# Patient Record
Sex: FEMALE | Race: BLACK OR AFRICAN AMERICAN
[De-identification: names, ages, dates, MRNs, and addresses within clinical notes are randomized per-mention and may not be internally consistent; named-entity substitution may affect disease eponyms.]

---

## 2017-07-05 NOTE — ED PDOC
HPI: General Adult


Time Seen by Provider: 17 16:53


Chief Complaint (Nursing): ENT Problem


Chief Complaint (Provider): Throat Pain 


History Per: Patient


History/Exam Limitations: no limitations


Onset/Duration Of Symptoms: Days (3)


Current Symptoms Are (Timing): Still Present


Severity: Moderate


Additional Complaint(s): 


Tara Perdomo is a 46 y/o female presenting to the ER on 2017 with 

complaints of throat pain for three days. Patient describes the pain as a 

burning sensation. Denies any associated fever or cough. 








Past Medical History


Reviewed: Historical Data, Nursing Documentation, Vital Signs


Vital Signs: 


 Last Vital Signs











Temp  98.4 F   17 16:46


 


Pulse  77   17 16:46


 


Resp  18   17 16:46


 


BP  137/77   17 16:46


 


Pulse Ox  99   17 17:15














- Medical History


PMH: No Chronic Diseases





- Surgical History


Surgical History: No Surg Hx





- Family History


Family History: States: Unknown Family Hx





- Social History


Current smoker - smoking cessation education provided: No


Alcohol: None


Drugs: Denies





- Home Medications


Home Medications: 


 Ambulatory Orders











 Medication  Instructions  Recorded


 


Azithromycin [Zithromax] 250 mg PO DAILY #6 tab 17














- Allergies


Allergies/Adverse Reactions: 


 Allergies











Allergy/AdvReac Type Severity Reaction Status Date / Time


 


Penicillins Allergy  RASH Verified 17 16:46














Review of Systems


ROS Statement: Except As Marked, All Systems Reviewed And Found Negative


Constitutional: Negative for: Fever


ENT: Positive for: Throat Pain


Respiratory: Negative for: Cough





Physical Exam





- Reviewed


Nursing Documentation Reviewed: Yes


Vital Signs Reviewed: Yes





- Physical Exam


Appears: Positive for: Non-toxic, No Acute Distress


Head Exam: Positive for: ATRAUMATIC, NORMOCEPHALIC


Skin: Positive for: Normal Color.  Negative for: Rash


Eye Exam: Positive for: Normal appearance


ENT: Positive for: Normal ENT Inspection, Tonsillar Exudate ((+) bilat ), Other 

((+) midline uvula; patent airway open )


Neck: Positive for: Normal


Cardiovascular/Chest: Positive for: Regular Rate, Rhythm.  Negative for: Murmur


Respiratory: Positive for: Normal Breath Sounds.  Negative for: Respiratory 

Distress


Extremity: Positive for: Normal ROM.  Negative for: Deformity


Neurologic/Psych: Positive for: Alert, Oriented.  Negative for: Motor/Sensory 

Deficits





- ECG


O2 Sat by Pulse Oximetry: 99





Medical Decision Making


Medical Decision Makin:53





Initial Impression- 46 y/o female with throat pain





Pt will be discharged routinely with Rx for Azithromycin. Condition is stable 

for discharge. Clinical impression- Strep Pharyngitis 





Documented by Bala Resendez, acting as a scribe for Deepti Summers PA-C





All medical record entries made by the Scribe were at my direction and 

personally dictated by me. I


have reviewed the chart and agree that the record accurately reflects my 

personal performance of the


history, physical exam, medical decision making, and the department course for 

this patient. I have


also personally directed, reviewed, and agree with the discharge instructions 

and disposition.





Disposition





- Clinical Impression


Clinical Impression: 


 Strep pharyngitis








- Disposition


Disposition Time: 17:06


Condition: STABLE


Prescriptions: 


Azithromycin [Zithromax] 250 mg PO DAILY #6 tab


Instructions:  Strep Throat (ED)


Forms:  [facility] ED School/Work Excuse

## 2017-11-28 ENCOUNTER — HOSPITAL ENCOUNTER (EMERGENCY)
Dept: HOSPITAL 14 - H.ER | Age: 47
Discharge: HOME | End: 2017-11-28
Payer: MEDICAID

## 2017-11-28 VITALS
OXYGEN SATURATION: 99 % | SYSTOLIC BLOOD PRESSURE: 147 MMHG | TEMPERATURE: 97 F | HEART RATE: 78 BPM | RESPIRATION RATE: 18 BRPM | DIASTOLIC BLOOD PRESSURE: 72 MMHG

## 2017-11-28 DIAGNOSIS — M79.641: Primary | ICD-10-CM

## 2017-11-28 DIAGNOSIS — Z88.0: ICD-10-CM

## 2017-11-28 NOTE — RAD
PROCEDURE:  Right Hand Radiographs.



HISTORY:

r hand  



COMPARISON:

None available.



FINDINGS:



BONES:

No acute displaced fracture. 



JOINTS:

No dislocation. 



SOFT TISSUES:

No evidence of radiopaque foreign body. 



OTHER FINDINGS:

None.



IMPRESSION:

No acute displaced fracture, dislocation, or significant joint 

effusion identified.



If symptoms persist, or if there is continued clinical concern, x-ray 

follow-up in 7-10 days should be considered.

## 2017-11-28 NOTE — ED PDOC
Upper Extremity Pain/Injury


Time Seen by Provider: 11/28/17 09:08


Chief Complaint (Nursing): Upper Extremity Problem/Injury


Chief Complaint (Provider): Upper extremity problem


History Per: Patient


History/Exam Limitations: no limitations


Onset/Duration Of Symptoms: Days (x1)


Current Symptoms Are (Timing): Still Present


Additional Complaint(s): 





Tara Perdomo is a 47 year old female, with no past medical history, who 

presents to the emergency department complaining of a swollen right hand onset 

for 1 day. She denies any trauma or similar symptoms in the past. She denies 

any forearm pain, rash or fever. No further medical complaints.





PMD: Kristen Jimenez





Past Medical History


Reviewed: Historical Data, Nursing Documentation, Vital Signs


Vital Signs: 


 Last Vital Signs











Temp  97 F L  11/28/17 09:07


 


Pulse  78   11/28/17 09:07


 


Resp  18   11/28/17 09:07


 


BP  147/72   11/28/17 09:07


 


Pulse Ox  99   11/28/17 09:07














- Family History


Family History: States: Unknown Family Hx





- Home Medications


Home Medications: 


 Ambulatory Orders











 Medication  Instructions  Recorded


 


Azithromycin [Zithromax] 250 mg PO DAILY #6 tab 07/05/17














- Allergies


Allergies/Adverse Reactions: 


 Allergies











Allergy/AdvReac Type Severity Reaction Status Date / Time


 


Penicillins Allergy  RASH Verified 11/28/17 09:07














Review of Systems


ROS Statement: Except As Marked, All Systems Reviewed And Found Negative


Constitutional: Negative for: Fever, Other (trauma)


Musculoskeletal: Positive for: Hand Pain (right hand swollen).  Negative for: 

Arm Pain (forearm)


Skin: Negative for: Rash





Physical Exam





- Reviewed


Nursing Documentation Reviewed: Yes


Vital Signs Reviewed: Yes





- Physical Exam


Appears: Positive for: Well, Non-toxic, No Acute Distress


Head Exam: Positive for: ATRAUMATIC, NORMAL INSPECTION, NORMOCEPHALIC


Skin: Positive for: Normal Color, Warm, Dry


Eye Exam: Positive for: EOMI, Normal appearance, PERRL


Neck: Positive for: Normal, Painless ROM, Supple


Pulses-Radial (L): 2+


Pulses-Radial (R): 2+


Extremity: Positive for: Normal ROM (Full ROM of right hand), Capillary Refill (

normal < 2s), Swelling (right hand swollen. No cellulitis, no abscess no open 

skin. No forearm involvement.)


Neurologic/Psych: Positive for: Alert, Oriented





- ECG


O2 Sat by Pulse Oximetry: 99 (RA)


Pulse Ox Interpretation: Normal





Medical Decision Making


Medical Decision Making: 





Initial Impression: Swollen right hand 





Initial Plan:





--Motrin  MG po


--Hand right 3 views [RAD]


--reevaluation








1029


Hand X-Ray


FINDINGS:





BONES:


No acute displaced fracture. 





JOINTS:


No dislocation. 





SOFT TISSUES:


No evidence of radiopaque foreign body. 





OTHER FINDINGS:


None.





IMPRESSION:


No acute displaced fracture, dislocation, or significant joint effusion 

identified.





If symptoms persist, or if there is continued clinical concern, x-ray follow-up 

in 7-10 days should be considered.








1150


-The area of the splint was appropriately positioned. Good position. 

Neurovascular status remains intact. Patient tolerated the procedure well with 

no immediate complications. Patient will be discharged home with follow up with 

an outpatient clinic. 








--------------------------------------------------------------------------------

-----------------


Scribe Attestation:


Documented by Kendall Hawk, acting as a scribe for Gretta Thomas MD





Provider Scribe Attestation:


All medical record entries made by the Scribe were at my direction and 

personally dictated by me. I have reviewed the chart and agree that the record 

accurately reflects my personal performance of the history, physical exam, 

medical decision making, and the department course for this patient. I have 

also personally directed, reviewed, and agree with the discharge instructions 

and disposition.





Disposition





- Clinical Impression


Clinical Impression: 


 Hand pain








- Disposition


Referrals: 


[facility] [Outside]


[facility] [Outside]


Condition: IMPROVED


Additional Instructions: 


follow up with your primary doctor in 1-2 days


take motrin for pain


use splint for comfort


return to the ED with any worsening or concerning symptoms. 


Instructions:  Arthralgia (ED)


Forms:  [facility] (English)

## 2018-07-28 ENCOUNTER — HOSPITAL ENCOUNTER (EMERGENCY)
Dept: HOSPITAL 14 - H.ER | Age: 48
Discharge: HOME | End: 2018-07-28
Payer: MEDICAID

## 2018-07-28 VITALS
DIASTOLIC BLOOD PRESSURE: 98 MMHG | OXYGEN SATURATION: 100 % | RESPIRATION RATE: 18 BRPM | HEART RATE: 79 BPM | TEMPERATURE: 98.4 F | SYSTOLIC BLOOD PRESSURE: 141 MMHG

## 2018-07-28 DIAGNOSIS — J02.9: ICD-10-CM

## 2018-07-28 DIAGNOSIS — Z88.0: ICD-10-CM

## 2018-07-28 DIAGNOSIS — J32.9: Primary | ICD-10-CM

## 2018-07-28 NOTE — ED PDOC
HPI: CCC, URI, Sore Throat


Time Seen by Provider: 07/28/18 13:13


Chief Complaint (Nursing): Cough, Cold, Congestion


Chief Complaint (Provider): throat pain, congestion


History Per: Patient


History/Exam Limitations: no limitations


Onset/Duration Of Symptoms: Days (x3)


Current Symptoms Are (Timing): Still Present


Location Of Pain: Ear(s)


Additional Complaint(s): 


 48 year old female presents to the ED complaining of congestion, right ear pain

, and throat pain for 3 days.  Patient reports she has been taking claritin for 

congestion which has helped only minimally.  Patient denies fever and cough.  

No recent travel or known sick contacts. 





PMD: Kristen Morales





Past Medical History


Reviewed: Historical Data, Nursing Documentation, Vital Signs


Vital Signs: 


 Last Vital Signs











Temp  98.4 F   07/28/18 13:12


 


Pulse  79   07/28/18 13:12


 


Resp  18   07/28/18 13:12


 


BP  141/98 H  07/28/18 13:12


 


Pulse Ox  100   07/28/18 13:39














- Medical History


PMH: No Chronic Diseases





- Surgical History


Other surgeries: Cerebral Shunt





- Family History


Family History: States: No Known Family Hx





- Living Arrangements


Living Arrangements: With Family





- Social History


Current smoker - smoking cessation education provided: No


Alcohol: None


Drugs: Denies





- Home Medications


Home Medications: 


 Ambulatory Orders











 Medication  Instructions  Recorded


 


Azithromycin [Zithromax] 250 mg PO DAILY #6 tab 07/05/17


 


Azithromycin [Zithromax] 250 mg PO DAILY #6 tab 07/28/18


 


Fluticasone Propionate [Flonase] 1 actuation NS DAILY #1 bottle 07/28/18














- Allergies


Allergies/Adverse Reactions: 


 Allergies











Allergy/AdvReac Type Severity Reaction Status Date / Time


 


Penicillins Allergy  RASH Verified 07/28/18 13:12














Review of Systems


ROS Statement: Except As Marked, All Systems Reviewed And Found Negative


Constitutional: Negative for: Fever, Chills


ENT: Positive for: Ear Pain (right), Nose Congestion, Throat Pain


Cardiovascular: Negative for: Chest Pain


Respiratory: Negative for: Cough


Gastrointestinal: Negative for: Nausea, Vomiting





Physical Exam





- Reviewed


Nursing Documentation Reviewed: Yes


Vital Signs Reviewed: Yes





- Physical Exam


Appears: Positive for: Well, Non-toxic, No Acute Distress


Head Exam: Positive for: ATRAUMATIC, NORMOCEPHALIC


Skin: Positive for: Normal Color


Eye Exam: Positive for: Normal appearance


ENT: Positive for: TM Is/Are (Right TM is bulging and erythematous with 

obscured landmarks consistent with otitis media), Nasal Congestion, Tonsillar 

Exudate (bilaterally with bilateral tonsillar erythema), Tonsillar Swelling


Neck: Positive for: Normal


Cardiovascular/Chest: Positive for: Regular Rate, Rhythm


Respiratory: Positive for: Normal Breath Sounds.  Negative for: Wheezing, 

Respiratory Distress


Extremity: Positive for: Normal ROM


Neurologic/Psych: Positive for: Alert, Oriented.  Negative for: Motor/Sensory 

Deficits





- ECG


O2 Sat by Pulse Oximetry: 100 (RA)


Pulse Ox Interpretation: Normal





Medical Decision Making


Medical Decision Making: 





Initial Impression: 49 y/o with sinusitis and pharyngitis





Initial Plan: 


Patient discharged with prescriptions for Zithromax and Flonase.  Advised 

ibuprofen for pain, Claritin for congestion and PMD follow-up in 2-3 days.





--------------------------------------------------------------------------------

-----------------


Scribe Attestation:


Documented by Trevon Sam acting as a scribe for Yokasta PEGUERO.





Provider Scribe Attestation:


All medical record entries made by the Scribe were at my direction and 

personally dictated by me. I have reviewed the chart and agree that the record 

accurately reflects my personal performance of the history, physical exam, 

medical decision making, and the department course for this patient. I have 

also personally directed, reviewed, and agree with the discharge instructions 

and disposition.





Disposition





- Clinical Impression


Clinical Impression: 


 Sinusitis, Pharyngitis








- Patient ED Disposition


Is Patient to be Admitted: No


Counseled Patient/Family Regarding: Diagnosis, Need For Followup, Rx Given





- Disposition


Referrals: 


Kristen Jimenez MD [Family Provider] - 


Disposition: Routine/Home


Disposition Time: 13:22


Condition: STABLE


Additional Instructions: 


Take prescription meds as directed. Advil for pain as needed. Over-the-counter 

Claritin for congestion. Follow-up with primary doctor in 2-3 days.


Prescriptions: 


Azithromycin [Zithromax] 250 mg PO DAILY #6 tab


Fluticasone Propionate [Flonase] 1 actuation NS DAILY #1 bottle


Instructions:  Sore Throat in Adults, Sinusitis, Adult (DC)


Forms:  [facility] (English)